# Patient Record
Sex: MALE | Race: WHITE
[De-identification: names, ages, dates, MRNs, and addresses within clinical notes are randomized per-mention and may not be internally consistent; named-entity substitution may affect disease eponyms.]

---

## 2022-05-13 ENCOUNTER — HOSPITAL ENCOUNTER (OUTPATIENT)
Dept: HOSPITAL 61 - US | Age: 32
End: 2022-05-13
Attending: FAMILY MEDICINE
Payer: COMMERCIAL

## 2022-05-13 DIAGNOSIS — Z96.698: ICD-10-CM

## 2022-05-13 DIAGNOSIS — Z02.71: Primary | ICD-10-CM

## 2022-05-13 DIAGNOSIS — M24.675: ICD-10-CM

## 2022-05-13 DIAGNOSIS — G60.9: ICD-10-CM

## 2022-05-13 PROCEDURE — 93925 LOWER EXTREMITY STUDY: CPT

## 2022-05-13 PROCEDURE — 93922 UPR/L XTREMITY ART 2 LEVELS: CPT

## 2022-05-13 NOTE — RAD
EXAMINATION: Upper and lower extremity pressure measurements of ankle/brachial index.



INDICATION: Foot pain after surgery, peripheral arterial disease



FINDINGS: The ankle/brachial index on the right side is 1.1, and on the left is 1.0. 



IMPRESSION: Normal NIRALI, bilaterally.



Electronically signed by: Chet Valadez MD (5/13/2022 12:12 PM) [id number]

## 2022-05-13 NOTE — RAD
Bilateral  lower extremity arterial ultrasound 



History: : 31 years  Male  Reason: POST LEFT FOOT SURGERY/NERVE DAMAGE/PAIN, peripheral arterial dise
ase



Findings: Multiple grayscale, color, and duplex spectral analysis sonographic images were acquired of
 the lower extremity arteries. 



COMPARISON:  None.





FINDINGS:

Grayscale evaluation of the femoropopliteal segments demonstrate mild scattered intimal plaque compat
ible with mild peripheral arterial disease.



Color Doppler demonstrate patent arterial segments from the CFA to dorsalis pedis and posterior tibia
l arteries.



Waveforms:

Right LE: Triphasic throughout

Left LE: Triphasic throughout





Velocities:

Within normal range bilaterally.



Impression:

No evidence of high-grade stenosis in  bilateral lower extremity arteries.



Electronically signed by: Chet Valadez MD (5/13/2022 12:31 PM) [id number]